# Patient Record
Sex: MALE | URBAN - METROPOLITAN AREA
[De-identification: names, ages, dates, MRNs, and addresses within clinical notes are randomized per-mention and may not be internally consistent; named-entity substitution may affect disease eponyms.]

---

## 2019-05-14 ENCOUNTER — APPOINTMENT (RX ONLY)
Dept: URBAN - METROPOLITAN AREA CLINIC 141 | Facility: CLINIC | Age: 34
Setting detail: DERMATOLOGY
End: 2019-05-14

## 2019-05-14 DIAGNOSIS — L57.8 OTHER SKIN CHANGES DUE TO CHRONIC EXPOSURE TO NONIONIZING RADIATION: ICD-10-CM

## 2019-05-14 DIAGNOSIS — L81.1 CHLOASMA: ICD-10-CM

## 2019-05-14 PROCEDURE — ? COUNSELING

## 2019-05-14 PROCEDURE — ? PRODUCT LINE (HYPERPIGMENTATION)

## 2019-05-14 PROCEDURE — 99202 OFFICE O/P NEW SF 15 MIN: CPT

## 2019-05-14 ASSESSMENT — LOCATION DETAILED DESCRIPTION DERM
LOCATION DETAILED: LEFT CENTRAL MALAR CHEEK
LOCATION DETAILED: RIGHT INFERIOR CENTRAL MALAR CHEEK

## 2019-05-14 ASSESSMENT — LOCATION ZONE DERM: LOCATION ZONE: FACE

## 2019-05-14 ASSESSMENT — LOCATION SIMPLE DESCRIPTION DERM
LOCATION SIMPLE: LEFT CHEEK
LOCATION SIMPLE: RIGHT CHEEK

## 2019-05-14 NOTE — PROCEDURE: PRODUCT LINE (HYPERPIGMENTATION)
Product 79 Price (In Dollars - Numeric Only, No Special Characters Or $): 0.00
Product 17 Units: 0
Product 2 Application Directions: Apply to face in the morning
Name Of Product 4: Duy 5/2 cleanser
Name Of Product 3: Tretinoin 0.05%
Detail Level: Zone
Product 4 Application Directions: Wash face bid
Product 3 Application Directions: Apply to face at bedtime
Allow Plan To Count Towards E/M Coding: Yes
Name Of Product 1: Melamix
Name Of Product 2: Elta facial

## 2019-05-14 NOTE — HPI: DISCOLORATION (MELASMA)
How Severe Are They?: moderate
Is This A New Presentation, Or A Follow-Up?: Discoloration
Additional History: Pt states he has discoloration from sun exposure

## 2019-06-26 ENCOUNTER — APPOINTMENT (RX ONLY)
Dept: URBAN - METROPOLITAN AREA CLINIC 141 | Facility: CLINIC | Age: 34
Setting detail: DERMATOLOGY
End: 2019-06-26

## 2019-06-26 DIAGNOSIS — L57.8 OTHER SKIN CHANGES DUE TO CHRONIC EXPOSURE TO NONIONIZING RADIATION: ICD-10-CM

## 2019-06-26 DIAGNOSIS — L81.1 CHLOASMA: ICD-10-CM

## 2019-06-26 PROCEDURE — 99212 OFFICE O/P EST SF 10 MIN: CPT

## 2019-06-26 PROCEDURE — ? COUNSELING

## 2019-06-26 PROCEDURE — ? ADDITIONAL NOTES

## 2019-06-26 PROCEDURE — ? PRODUCT LINE (HYPERPIGMENTATION)

## 2019-06-26 ASSESSMENT — LOCATION DETAILED DESCRIPTION DERM
LOCATION DETAILED: LEFT CENTRAL MALAR CHEEK
LOCATION DETAILED: RIGHT INFERIOR CENTRAL MALAR CHEEK

## 2019-06-26 ASSESSMENT — LOCATION ZONE DERM: LOCATION ZONE: FACE

## 2019-06-26 ASSESSMENT — LOCATION SIMPLE DESCRIPTION DERM
LOCATION SIMPLE: LEFT CHEEK
LOCATION SIMPLE: RIGHT CHEEK

## 2019-06-26 NOTE — PROCEDURE: PRODUCT LINE (HYPERPIGMENTATION)
Product 21 Units: 0
Product 53 Price (In Dollars - Numeric Only, No Special Characters Or $): 0.00
Product 6 Units: 1
Product 6 Application Directions: Apply to face at night
Product 4 Application Directions: Wash face bid
Product 3 Application Directions: Apply to face at bedtime
Name Of Product 1: Melamix
Product 2 Application Directions: Apply to face in the morning
Name Of Product 4: Duy 5/2 cleanser
Name Of Product 2: Elta facial
Render Product Pricing In Note: No
Name Of Product 3: Tretinoin 0.05%
Allow Plan To Count Towards E/M Coding: No (this will remove associated impression from E/M calculation)
Name Of Product 6: R Essentials Ultra plus moisturizer
Detail Level: Zone

## 2019-06-26 NOTE — PROCEDURE: ADDITIONAL NOTES
Additional Notes: Use tretinoin only Monday and Thursday to reduce irritation and tingling. Redness is normal. Continue SPF QAM and start a moisturizing night cream qhs.
Detail Level: Simple

## 2019-08-12 ENCOUNTER — APPOINTMENT (RX ONLY)
Dept: URBAN - METROPOLITAN AREA CLINIC 141 | Facility: CLINIC | Age: 34
Setting detail: DERMATOLOGY
End: 2019-08-12

## 2019-08-12 DIAGNOSIS — L81.1 CHLOASMA: ICD-10-CM

## 2019-08-12 PROCEDURE — ? ADDITIONAL NOTES

## 2019-08-12 PROCEDURE — ? PRODUCT LINE (HYPERPIGMENTATION)

## 2019-08-12 PROCEDURE — ? COUNSELING

## 2019-08-12 ASSESSMENT — LOCATION DETAILED DESCRIPTION DERM
LOCATION DETAILED: RIGHT INFERIOR CENTRAL MALAR CHEEK
LOCATION DETAILED: LEFT CENTRAL MALAR CHEEK

## 2019-08-12 ASSESSMENT — LOCATION ZONE DERM: LOCATION ZONE: FACE

## 2019-08-12 ASSESSMENT — LOCATION SIMPLE DESCRIPTION DERM
LOCATION SIMPLE: LEFT CHEEK
LOCATION SIMPLE: RIGHT CHEEK

## 2019-08-12 NOTE — PROCEDURE: PRODUCT LINE (HYPERPIGMENTATION)
Product 46 Price (In Dollars - Numeric Only, No Special Characters Or $): 0.00
Product 46 Units: 0
Render Product Pricing In Note: No
Detail Level: Zone
Name Of Product 5: Nonretinol brightenex
Product 4 Application Directions: Wash face bid
Product 3 Application Directions: Apply to face at bedtime
Name Of Product 2: Elta sport
Product 6 Units: 1
Name Of Product 3: Tretinoin 0.05%
Allow Plan To Count Towards E/M Coding: No (this will remove associated impression from E/M calculation)
Name Of Product 1: Melamix
Name Of Product 6: R Essentials Ultra plus moisturizer
Name Of Product 4: Duy 5/2 cleanser
Product 6 Application Directions: Apply to face at night
Product 2 Application Directions: Apply to face in the morning

## 2019-08-12 NOTE — PROCEDURE: ADDITIONAL NOTES
Additional Notes: Patient advised to use products more then 2 times a week unless the burning returns, if burning returns he is too reduce the amount of use per week. Instructed to use brighter without retinol.
Detail Level: Simple
Additional Notes: Refer to Julia for chemical peels

## 2019-10-16 ENCOUNTER — APPOINTMENT (RX ONLY)
Dept: URBAN - METROPOLITAN AREA CLINIC 141 | Facility: CLINIC | Age: 34
Setting detail: DERMATOLOGY
End: 2019-10-16

## 2019-10-16 DIAGNOSIS — L81.1 CHLOASMA: ICD-10-CM

## 2019-10-16 PROCEDURE — ? ADDITIONAL NOTES

## 2019-10-16 PROCEDURE — ? COUNSELING

## 2019-10-16 PROCEDURE — ? PRODUCT LINE (HYPERPIGMENTATION)

## 2019-10-16 ASSESSMENT — LOCATION ZONE DERM: LOCATION ZONE: FACE

## 2019-10-16 ASSESSMENT — LOCATION SIMPLE DESCRIPTION DERM
LOCATION SIMPLE: RIGHT CHEEK
LOCATION SIMPLE: LEFT CHEEK

## 2019-10-16 ASSESSMENT — LOCATION DETAILED DESCRIPTION DERM
LOCATION DETAILED: RIGHT INFERIOR CENTRAL MALAR CHEEK
LOCATION DETAILED: LEFT CENTRAL MALAR CHEEK

## 2019-10-16 NOTE — PROCEDURE: PRODUCT LINE (HYPERPIGMENTATION)
Product 64 Units: 0
Detail Level: Zone
Product 50 Price (In Dollars - Numeric Only, No Special Characters Or $): 0.00
Name Of Product 6: R Essentials Ultra plus moisturizer
Name Of Product 1: Melamix
Product 1 Units: 1
Name Of Product 4: Duy 5/2 cleanser
Product 1 Application Directions: Apply to face at bedtime
Name Of Product 5: Nonretinol brightenex
Allow Plan To Count Towards E/M Coding: No (this will remove associated impression from E/M calculation)
Name Of Product 3: Tretinoin 0.05%
Product 6 Application Directions: Apply to face at night
Name Of Product 2: Elta sport
Product 4 Application Directions: Wash face bid
Render Product Pricing In Note: No
Product 2 Application Directions: Apply to face in the morning

## 2019-10-16 NOTE — PROCEDURE: ADDITIONAL NOTES
Detail Level: Simple
Additional Notes: Patient advised to use products more then 2 times a week unless the burning returns, if burning returns he is too reduce the amount of use per week. Instructed to use brighter without retinol.
Additional Notes: Refer to Julia for chemical peels